# Patient Record
Sex: FEMALE | Race: WHITE | NOT HISPANIC OR LATINO | Employment: UNEMPLOYED | URBAN - METROPOLITAN AREA
[De-identification: names, ages, dates, MRNs, and addresses within clinical notes are randomized per-mention and may not be internally consistent; named-entity substitution may affect disease eponyms.]

---
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
## 2022-02-21 ENCOUNTER — OFFICE VISIT (OUTPATIENT)
Dept: SURGERY | Facility: CLINIC | Age: 22
End: 2022-02-21
Payer: COMMERCIAL

## 2022-02-21 VITALS — BODY MASS INDEX: 31.08 KG/M2 | WEIGHT: 158.3 LBS | HEIGHT: 60 IN

## 2022-02-21 DIAGNOSIS — E66.811 CLASS 1 OBESITY DUE TO EXCESS CALORIES WITH SERIOUS COMORBIDITY AND BODY MASS INDEX (BMI) OF 32.0 TO 32.9 IN ADULT: ICD-10-CM

## 2022-02-21 DIAGNOSIS — E66.09 CLASS 1 OBESITY DUE TO EXCESS CALORIES WITH SERIOUS COMORBIDITY AND BODY MASS INDEX (BMI) OF 32.0 TO 32.9 IN ADULT: ICD-10-CM

## 2022-02-21 DIAGNOSIS — E66.89 HYPOTHALAMIC OBESITY: ICD-10-CM

## 2022-02-21 PROCEDURE — 97803 MED NUTRITION INDIV SUBSEQ: CPT | Performed by: DIETITIAN, REGISTERED

## 2022-02-21 NOTE — PROGRESS NOTES
MEDICAL WEIGHT LOSS FOLLOW UP    Reason for visit: medical weight loss     DIAGNOSIS:  Class I Obesity    ANTHROPOMETRICS:  Initial Weight: 168.8 pounds (10/20/2021)  Previous Weight: 164.1 pounds (11/29/2021)  Current Weight:  158 lbs 4.8 oz   Weight Change: -5.8 lbs since last appointment  -10.4 lbs overall  BMI: Body mass index is 30.92 kg/m .     Weight Loss Medications:   None    NUTRITION HISTORY:  Breakfast: yogurt + fruit + granola   Lunch: salad + chicken/lean meat + yogurt and fruit  leftovers  meat + vegetables  Dinner: Casserole  grilled meats + vegetables + starch  salad   Snacks: goldfish, trail mix, fruit, candy, popcorn     Beverage choices:  Water/enhanced water, Specialty coffee (1x/week when meeting with friends), coffee (8oz, creamer), juice box (occasional), milk (occasionally),     Dining Out:  How often do you dine out: 1-2x/week  What types of food do you order: salad, fast food- [employer]       Exercise:  Type: walking, physical therapy (balance work; upper arm therapy), [employer]   Duration: 30 minutes or 1 mile  Frequency: 4-5x/week    Additional Information: Pt late; shorter visit. Presents with Dad (Valdo). Continues with many positive changes to meal quality and quantity. Encouraged ongoing mindfulness to hunger/satiety cues and protein intake. Pt's father will as physical therapist about appropriateness of strength training as balance continues to improve.      EVALUATION/PROGRESS TOWARDS GOALS:  Previous Goals:   Increase exercise to 4-5x/week - met  Try a new vegetable or recipe 2x/month - met/ongoing    Previous Nutrition Diagnosis:  Obese class I related to excess energy intake and self-monitoring deficit as evidenced by BMI of 32.05 kg/m2.  No change, modified below     Current Nutrition Diagnosis:   Obese class I related to excess energy intake and self-monitoring deficit as evidenced by BMI of 30.92 kg/m2.    INTERVENTION:    Nutrition Prescription:  Recommend modified  nutrient intake by decreasing energy intake.    Implementation:    Nutrition Education (Content):    Discussed previous goals and determined new goals    Encourage physical activity    Supported patient in attempted weight loss and behavior changes    Congratulated patient on successful weight loss     Patient verbalizes understanding of diet by stating she will continue to emphasize protein    Anticipate good compliance    Goals:  Continue to emphasize protein and vegetables at meal  Increase exercise as able pending PT/OT input    Follow Up/Monitoring:  Other  -  patient to follow up in 12 weeks    Time Spent With Patient:  15 Minutes        Delia Loving RD, LD  Clinical Dietitian

## 2022-03-07 DIAGNOSIS — Q61.3 POLYCYSTIC KIDNEY DISEASE: ICD-10-CM

## 2022-03-07 RX ORDER — LISINOPRIL 2.5 MG/1
2.5 TABLET ORAL DAILY
Qty: 30 TABLET | Refills: 0 | Status: SHIPPED | OUTPATIENT
Start: 2022-03-07

## 2022-05-17 NOTE — PROGRESS NOTES
MEDICAL WEIGHT LOSS FOLLOW UP      DIAGNOSIS:  Class I Obesity    NUTRITION HISTORY:    Breakfast: 1 piece egg bake (peppers, black beans, eggs, hash browns, cheese), Greek yogurt @ 6:30-7:30     Lunch:  lettuce salad kit with chicken, some day small bag trail mix or apple @ 11:00     Dinner:  Chicken quesidilla (peppers, onion, cheese)  Or hamburger on bun, french fries or lettuce salad @ 6:30-7:00     Snacks:  Kind bar, apple- after school/ on weekends might bring trail mix to game     Beverage Choices:  48 oz water, 2 cans sparkling water, 1 cup coffee + flavored creamer     Exercise: walking-3-4X for 20-30 min. And home PT regimen (core/ balance) 2X per week        Other: **Patient is accompanied by hr mom, Leigh Ann. *Tracking intake in her calendar on her phone.Full time student.     ANTHROPOMETRICS:    Initial Weight: 168.8 pounds    Previous Weight: 158.3  pounds    Current Weight:  155.1 pounds    Weight Change:  decrease 3.2 lb since last RD visit or  13.1 lb over all    BMI: 30.28 kg/m2    MEDICATIONS FOR WEIGHT LOSS:  None    EVALUATION/PROGRESS TOWARDS GOALS:  Previous Goals:  Continue to emphasize protein and vegetables at meal-improving (doing more chicken or beef, yogurt, legumes, salad kits, cucumbers, carrots)  Increase exercise as able pending PT/OT input-pt says done with OT and PT      Previous Nutrition Diagnosis:  Obese class I related to excess energy intake as evidence by BMI of 30.92 kg/m2     Current Nutrition Diagnosis:   Obese class I related to excess energy intake as evidence by BMI of 30.28 kg/m2  No change      INTERVENTION:    Nutrition Prescription:  Recommend modified nutrient intake by decreasing energy intake    Implementation:    Meals and Snacks: 3/1    Nutrition Education (Content):    Discussed previous goals and determined new goals    Encourage physical activity    Supported patient in attempted weight loss and behavior changes     Congratulated patient on successful  weight loss     Patient verbalizes understanding of weight loss    Anticipate good compliance    Goals:  Do home Pt regimen as advised every other day  Slowly increase fiber to 25-35 grams per day    Follow Up/Monitoring:  Other  -  patient to follow up in 4 months    Time Spent With Patient:  28 Minutes  Jermaine Gutierrez RD, LD  [facility]

## 2022-05-23 ENCOUNTER — ALLIED HEALTH/NURSE VISIT (OUTPATIENT)
Dept: SURGERY | Facility: CLINIC | Age: 22
End: 2022-05-23
Payer: COMMERCIAL

## 2022-05-23 VITALS — WEIGHT: 155.1 LBS | BODY MASS INDEX: 30.29 KG/M2

## 2022-05-23 DIAGNOSIS — E66.09 CLASS 1 OBESITY DUE TO EXCESS CALORIES WITH SERIOUS COMORBIDITY AND BODY MASS INDEX (BMI) OF 32.0 TO 32.9 IN ADULT: ICD-10-CM

## 2022-05-23 DIAGNOSIS — E66.811 CLASS 1 OBESITY DUE TO EXCESS CALORIES WITH SERIOUS COMORBIDITY AND BODY MASS INDEX (BMI) OF 32.0 TO 32.9 IN ADULT: ICD-10-CM

## 2022-05-23 PROCEDURE — 97803 MED NUTRITION INDIV SUBSEQ: CPT | Performed by: DIETITIAN, REGISTERED
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
## 2022-06-02 ENCOUNTER — OFFICE VISIT (OUTPATIENT)
Dept: ENDOCRINOLOGY | Facility: CLINIC | Age: 22
End: 2022-06-02
Attending: PEDIATRICS
Payer: COMMERCIAL

## 2022-06-02 VITALS
DIASTOLIC BLOOD PRESSURE: 92 MMHG | HEIGHT: 60 IN | WEIGHT: 153.22 LBS | BODY MASS INDEX: 30.08 KG/M2 | HEART RATE: 78 BPM | SYSTOLIC BLOOD PRESSURE: 162 MMHG

## 2022-06-02 DIAGNOSIS — E66.89 HYPOTHALAMIC OBESITY: Primary | ICD-10-CM

## 2022-06-02 PROCEDURE — 99214 OFFICE O/P EST MOD 30 MIN: CPT | Performed by: PEDIATRICS

## 2022-06-02 PROCEDURE — G0463 HOSPITAL OUTPT CLINIC VISIT: HCPCS

## 2022-06-02 ASSESSMENT — PAIN SCALES - GENERAL: PAINLEVEL: NO PAIN (0)

## 2022-06-02 NOTE — NURSING NOTE
"[facility] [[id number]]  Chief Complaint   Patient presents with     RECHECK     Follow Up     Initial BP (!) 162/92   Pulse 78   Ht 5' 0.08\" (152.6 cm)   Wt 153 lb 3.5 oz (69.5 kg)   BMI 29.85 kg/m   Estimated body mass index is 29.85 kg/m  as calculated from the following:    Height as of this encounter: 5' 0.08\" (152.6 cm).    Weight as of this encounter: 153 lb 3.5 oz (69.5 kg).  Medication Reconciliation: complete     Claire Pa, EMT        "
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
## 2022-06-02 NOTE — PATIENT INSTRUCTIONS
Thank you for choosing [facility].     It was a pleasure to see you today.      Providers:       [place]:    MD Elidia Márquez MD Eric Bomberg MD Sandy Chen Liu, MD Bradley Miller MD PhD      [facility]sysa [facility]ayas [facility]    Care Coordinators (non urgent calls) Mon- Fri:  Barbie Jerome MS RN  [phone]   Sully Lynn, RN, CPN  [phone]  Kaitlyn Newberry, ELINOR, [phone]     Care Coordinator fax: [phone]    Growth Hormone: Deirdre Spivey CMA   [phone]     Please leave a message on one line only. Calls will be returned as soon as possible once your physician has reviewed the results or questions.   Medication renewal requests must be faxed to the main office by your pharmacy.  Allow 3-4 days for completion.   Fax: [phone]    Mailing Address:  Pediatric Endocrinology  Academic Office [address]  [address]    Test results may be available via Tuee prior to your provider reviewing them. Your provider will review results as soon as possible once all labs are resulted.   Abnormal results will be communicated to you via Tuee, telephone call or letter.  Please allow 2 -3 weeks for processing/interpretation of most lab work.  If you live in the [facility] area and need labs, we request that the labs be done at an [facility] facility.  [place] locations are listed on the [email or web address] website. Please call that site for a lab time.   For urgent issues that cannot wait until the next business day, call [phone] and ask for the Pediatric Endocrinologist on call.    Scheduling:    Access Center: [phone] for [facility] - 3rd floor [facility] 9th floor [facility] [phone] (for stimulation tests)  Radiology/ [phone]   Services:   [phone]     Please sign up for Tuee for easy and  HIPAA compliant confidential communication.  Sign up at the clinic  or go to [email or web address]   Patients must be seen in clinic annually to continue to receive prescriptions and test results.   Patients on growth hormone must be seen twice yearly.     COVID-19 Recommendations: Pediatric Endocrinology  The Division of Endocrinology at the [facility] encourages our patients to receive vaccination against the SARS CoV2 virus that causes COVID-19. At this time, the only vaccine approved in children is the Pfizer vaccine for children 12 years or older. If you are 12 years or older, we encourage you to receive the first vaccine that is available to you.   Please go to [email or web address] to register to receive your vaccine at an [facility] location.  Once you are registered, you will be contacted to schedule an appointment when vaccine is available.   Please go to [email or web address] to register to receive your vaccine through the [facility]'s Vaccine Connector portal. You will be contacted to schedule an appointment when vaccine is available.  You can also register to receive the vaccine from a local pharmacy.  As vaccines receive Emergency Use Authorization or Approval by the FDA for younger ages, we recommend that all children with endocrine disorders receive the vaccine unless there is an allergy to the vaccine or its ingredients. Children receiving endocrine medications such as growth hormone, hydrocortisone or levothyroxine are still eligible to receive the vaccination.   If you would like to get your child tested for COVID-19, please go to [email or web address] for information about [facility] testing locations.    Your child has been seen in the Pediatric Endocrinology Specialty Clinic.  Our goal is to co-manage your child's medical care  along with their primary care physician.  We manage care needs related to the endocrine diagnosis but primary care issues including preventative care or acute illness visits, COVID concerns, camp forms, etc must be managed by your local primary care physician.  Please inform our coordinators if the patient has any emergency department visits or hospitalizations related to their endocrine diagnosis.      Please refer to the CDC and [facility] department of health websites for information regarding precautions surrounding COVID-19.  At this time, there is no evidence to suggest that your child's endocrine diagnosis increases risk for karina COVID-19.  This is an ongoing area of research, however,and we will update you as further research becomes available.

## 2022-06-27 ENCOUNTER — LAB REQUISITION (OUTPATIENT)
Dept: LAB | Facility: CLINIC | Age: 22
End: 2022-06-27

## 2022-06-27 LAB
HBV SURFACE AB SERPL IA-ACNC: 6.31 M[IU]/ML
HBV SURFACE AG SERPL QL IA: NONREACTIVE

## 2022-06-27 PROCEDURE — 87340 HEPATITIS B SURFACE AG IA: CPT | Performed by: INTERNAL MEDICINE

## 2022-06-27 PROCEDURE — 86706 HEP B SURFACE ANTIBODY: CPT | Performed by: INTERNAL MEDICINE

## 2022-06-27 PROCEDURE — 86481 TB AG RESPONSE T-CELL SUSP: CPT | Performed by: INTERNAL MEDICINE

## 2022-06-28 LAB
GAMMA INTERFERON BACKGROUND BLD IA-ACNC: 0.05 IU/ML
M TB IFN-G BLD-IMP: NEGATIVE
M TB IFN-G CD4+ BCKGRND COR BLD-ACNC: 9.95 IU/ML
MITOGEN IGNF BCKGRD COR BLD-ACNC: -0.02 IU/ML
MITOGEN IGNF BCKGRD COR BLD-ACNC: -0.02 IU/ML
QUANTIFERON MITOGEN: 10 IU/ML
QUANTIFERON NIL TUBE: 0.05 IU/ML
QUANTIFERON TB1 TUBE: 0.03 IU/ML
QUANTIFERON TB2 TUBE: 0.03

## 2022-08-30 NOTE — TELEPHONE ENCOUNTER
Faxed referral request for Dr. Lalit Paniagua with adult nephrology, visit note and demographics to [facility] at [phone].  Attn: Jing Pearce.  Asked they reach out to patient to schedule appointment.   [facility]          Chief Complaint        Chief Complaint   Patient presents with    SS/URI     ST, cough, fever, sneezing             History of Present Illness           Shira Morel is a 22 y.o. female with chronic conditions of none who presents today for sore throat, cough, and congestion.  Pt's s/s started 3 days ago.  Pt denies fever, SOB, chest pains, n/v, and dizziness.          Past Medical History:     History reviewed. No pertinent past medical history.          Past Surgical History:      has no past surgical history on file.          Social History:     Social History     Tobacco Use    Smoking status: Never    Smokeless tobacco: Never   Substance Use Topics    Alcohol use: Never             I personally reviewed all past medical, surgical, and social.           Review of Systems   Constitutional:  Positive for fatigue.   HENT:  Positive for congestion, sinus pressure and sore throat.    Eyes: Negative.    Respiratory:  Positive for cough.    Cardiovascular: Negative.    Gastrointestinal: Negative.    Endocrine: Negative.    Genitourinary: Negative.    Musculoskeletal: Negative.    Skin: Negative.    Allergic/Immunologic: Negative.    Neurological: Negative.    Psychiatric/Behavioral: Negative.              Medications:     Outpatient Encounter Medications as of 8/30/2022   Medication Sig Dispense Refill    etonogestrel (NEXPLANON SDRM) by Subdermal route.      azithromycin (Z-RUSS) 250 MG tablet TAKE TWO TABS PO ON DAY 1, THEN TAKE ONE TAB A DAY FOR 4 DAYS 6 tablet 0    methylPREDNISolone (MEDROL DOSEPACK) 4 mg tablet use as directed 21 each 0     No facility-administered encounter medications on file as of 8/30/2022.             Allergies:     Review of patient's allergies indicates:  No Known Allergies          Health Maintenance:       There is no immunization history on file for this patient.      Health Maintenance   Topic Date Due    Hepatitis C Screening  Never done    Lipid  "Panel  Never done    HPV Vaccines (1 - 2-dose series) Never done    TETANUS VACCINE  Never done              Physical Exam           Vital Signs  Temp: 97 °F (36.1 °C)  Temp src: Oral  Pulse: 93  SpO2: 99 %  BP: 110/80  BP Location: Left arm  Patient Position: Sitting  Height and Weight  Height: 5' 6" (167.6 cm)  Weight: 80.3 kg (177 lb)  BSA (Calculated - sq m): 1.93 sq meters  BMI (Calculated): 28.6  Weight in (lb) to have BMI = 25: 154.6]          Physical Exam  Vitals and nursing note reviewed.   Constitutional:       General: She is not in acute distress.     Appearance: Normal appearance. She is not ill-appearing.   HENT:      Head: Normocephalic.      Right Ear: External ear normal.      Left Ear: External ear normal.      Nose: Congestion and rhinorrhea present.      Mouth/Throat:      Mouth: Mucous membranes are moist.   Eyes:      Pupils: Pupils are equal, round, and reactive to light.   Cardiovascular:      Rate and Rhythm: Normal rate and regular rhythm.      Pulses: Normal pulses.      Heart sounds: Normal heart sounds. No murmur heard.    No friction rub. No gallop.   Pulmonary:      Effort: Pulmonary effort is normal. No respiratory distress.      Breath sounds: Normal breath sounds. No stridor. No wheezing, rhonchi or rales.   Abdominal:      General: There is no distension.      Palpations: Abdomen is soft.   Musculoskeletal:         General: Normal range of motion.      Cervical back: Normal range of motion and neck supple. No rigidity or tenderness.   Skin:     General: Skin is warm and dry.      Coloration: Skin is not jaundiced or pale.   Neurological:      General: No focal deficit present.      Mental Status: She is alert and oriented to person, place, and time. Mental status is at baseline.      Cranial Nerves: No cranial nerve deficit.      Sensory: No sensory deficit.      Motor: No weakness.      Coordination: Coordination normal.      Gait: Gait normal.   Psychiatric:         Mood and " Affect: Mood normal.         Behavior: Behavior normal.         Thought Content: Thought content normal.         Judgment: Judgment normal.              Laboratory:     CBC:            CMP:           Invalid input(s): CREATININ     LIPIDS:            TSH:            A1C:                 Assessment/Plan          Shira Morel is a 22 y.o.female with:           1. Pharyngitis, unspecified etiology  - methylPREDNISolone (MEDROL DOSEPACK) 4 mg tablet; use as directed  Dispense: 21 each; Refill: 0  - azithromycin (Z-RUSS) 250 MG tablet; TAKE TWO TABS PO ON DAY 1, THEN TAKE ONE TAB A DAY FOR 4 DAYS  Dispense: 6 tablet; Refill: 0      -ideally treat s/s for the next 5-7 days; advised not to take the azithromycin until at least 5 days from now         Total time spent face-to-face and non-face-to-face coordinating care for this encounter was: 20 min          Chronic conditions status updated as per HPI.  Other than changes above, cont current medications and maintain follow up with specialists.  Return to clinic PRN.          ENRIQUE Hogan     [facility]

## 2022-09-20 ENCOUNTER — VIRTUAL VISIT (OUTPATIENT)
Dept: SURGERY | Facility: CLINIC | Age: 22
End: 2022-09-20
Payer: COMMERCIAL

## 2022-09-20 VITALS — BODY MASS INDEX: 31.1 KG/M2 | WEIGHT: 158.4 LBS | HEIGHT: 60 IN

## 2022-09-20 DIAGNOSIS — E66.811 CLASS 1 OBESITY DUE TO EXCESS CALORIES WITH SERIOUS COMORBIDITY AND BODY MASS INDEX (BMI) OF 32.0 TO 32.9 IN ADULT: ICD-10-CM

## 2022-09-20 DIAGNOSIS — E66.09 CLASS 1 OBESITY DUE TO EXCESS CALORIES WITH SERIOUS COMORBIDITY AND BODY MASS INDEX (BMI) OF 32.0 TO 32.9 IN ADULT: ICD-10-CM

## 2022-09-20 PROCEDURE — 97803 MED NUTRITION INDIV SUBSEQ: CPT | Mod: 95 | Performed by: DIETITIAN, REGISTERED

## 2022-09-20 NOTE — PROGRESS NOTES
Libby is a 22 year old who is being evaluated via a billable video visit.      How would you like to obtain your AVS? MyChart  If the video visit is dropped, the invitation should be resent by: Text to cell phone: [phone]  Will anyone else be joining your video visit? Dad (Valdo)          Video-Visit Details    Video Start Time: 8:28am    Type of service:  Video Visit    Video End Time:8:48am    Originating Location (pt. Location): Home    Distant Location (provider location):  [facility] [place]     Platform used for Video Visit: Proactive Comfort      MEDICAL WEIGHT LOSS FOLLOW UP    Reason for visit: medical weight loss     DIAGNOSIS:  Class I Obesity    ANTHROPOMETRICS:  Initial Weight: 168.8 pounds  Previous Weight: 155.1 pounds  Current Weight:  158 lbs 6.4 oz   Weight Change: +3.3 lbs   BMI: 30.85 kg/m2    Weight Loss Medications:   None    NUTRITION HISTORY:  Breakfast: yogurt + granola +/- fruit   Lunch: casseroles (leftovers)  chicken sandwich  wrap   Dinner: roast beef + potatoes + carrots  pizza  meat + vegetables + starch  Snacks: sweets (if at home)  apples + peanut butter  banana  yogurt  popcorn   Beverage choices: water (84oz), coffee (8-16oz; creamer), milk (occasionally), sparkling water   Eating behaviors: will have sweets if they are in the home    Dining Out:  How often do you dine out: 1-2x/week  What types of food do you order: pasta, Chinese, salad        Exercise:  Type: walking around school building  Duration: 10-15 minutes   Frequency: 2-3x/week    Additional Information: Pt and Dad share they had a very busy summer of baseball-related travel for pt's brother. Also enrolled in classes at both [employer] and [employer]. Less focus on exercise the last several months. Appropriate questions about intake of sweets recently. Discussed exercise videos as good outlet for movement options in between classes. Discussed non-food alternatives to cravings.       EVALUATION/PROGRESS TOWARDS GOALS:  Previous Goals:   Do home Pt regimen as advised every other day - ongoing  Slowly increase fiber to 25-35 grams per day - ongoing    Previous Nutrition Diagnosis:  Obese class I related to excess energy intake and self-monitoring deficit as evidenced by BMI of 30.28 kg/m2.  No change, modified below     Current Nutrition Diagnosis:   Obese class I related to excess energy intake and self-monitoring deficit as evidenced by BMI of 30.85 kg/m2.    INTERVENTION:    Nutrition Prescription:  Recommend modified nutrient intake by decreasing energy intake.    Implementation:    Nutrition Education (Content):    Discussed previous goals and determined new goals    Encourage physical activity    Supported patient in attempted weight loss and behavior changes    Patient verbalizes understanding of diet by stating she will increase exercise    Anticipate fair-good compliance    Goals:  Be mindful of intake of sweets  Increase exercise - explore online exercise videos    Follow Up/Monitoring:  Other  -  patient to follow up in 12-16 weeks    Time Spent With Patient:  20 Minutes      Delia Loving RD, LD  Clinical Dietitian

## 2022-09-20 NOTE — PATIENT INSTRUCTIONS
"Tomás Souza!    Great to see you guys this morning! Here's the goals we discussed:    Be mindful with your intake of sweets  - Try keeping sweets out of reach (taller shelf or cabinet) to reduce mindless reaching/grabbing snacks throughout the day  - Keep a log/journal when you eat sweets, also noting any emotions you are experiencing (ie boredom, stress, sadness, loneliness, etc)  - Practice non-food alternatives to cravings: crossword puzzles/word games, reading, games, arts/crafts/hobbies, adult coloring books, journaling, etc      Increase exercise  - Explore online exercise videos  - With students, it's particularly helpful to have several options for 5-10 minute \"bursts\" of activity to sprinkle throughout your day/in between classes  - Maximize your days off with longer walks/workouts  - Continue to walk around campus when able  - Create a daily \"itinerary\" for yourself to identify where you can squeeze in some exercise        Otherwise, have a wonderful semester and keep up the good work. Let's plan on following up in December - this can be scheduled via our call center at . Reach out if you have any questions or concerns. Have a great week!      Delia Loving RD, LD  Clinical Dietitian     "

## 2022-12-26 ENCOUNTER — HEALTH MAINTENANCE LETTER (OUTPATIENT)
Age: 22
End: 2022-12-26

## 2024-02-04 ENCOUNTER — HEALTH MAINTENANCE LETTER (OUTPATIENT)
Age: 24
End: 2024-02-04

## 2024-02-20 ENCOUNTER — MEDICAL CORRESPONDENCE (OUTPATIENT)
Dept: HEALTH INFORMATION MANAGEMENT | Facility: CLINIC | Age: 24
End: 2024-02-20
Payer: COMMERCIAL

## 2024-02-20 ENCOUNTER — TRANSFERRED RECORDS (OUTPATIENT)
Dept: HEALTH INFORMATION MANAGEMENT | Facility: CLINIC | Age: 24
End: 2024-02-20
Payer: COMMERCIAL

## 2024-02-22 DIAGNOSIS — R00.0 TACHYCARDIA: Primary | ICD-10-CM

## 2024-03-01 ENCOUNTER — OFFICE VISIT (OUTPATIENT)
Dept: CARDIOLOGY | Facility: CLINIC | Age: 24
End: 2024-03-01
Attending: PEDIATRICS
Payer: COMMERCIAL

## 2024-03-01 VITALS
SYSTOLIC BLOOD PRESSURE: 114 MMHG | BODY MASS INDEX: 33.18 KG/M2 | WEIGHT: 169 LBS | HEART RATE: 77 BPM | HEIGHT: 60 IN | DIASTOLIC BLOOD PRESSURE: 68 MMHG | OXYGEN SATURATION: 99 %

## 2024-03-01 DIAGNOSIS — R00.0 TACHYCARDIA: ICD-10-CM

## 2024-03-01 DIAGNOSIS — R00.2 PALPITATIONS: ICD-10-CM

## 2024-03-01 DIAGNOSIS — R06.02 SOB (SHORTNESS OF BREATH): Primary | ICD-10-CM

## 2024-03-01 PROCEDURE — 93242 EXT ECG>48HR<7D RECORDING: CPT | Performed by: INTERNAL MEDICINE

## 2024-03-01 PROCEDURE — 93000 ELECTROCARDIOGRAM COMPLETE: CPT | Mod: 59 | Performed by: INTERNAL MEDICINE

## 2024-03-01 PROCEDURE — 99214 OFFICE O/P EST MOD 30 MIN: CPT | Mod: 25 | Performed by: INTERNAL MEDICINE

## 2024-03-01 RX ORDER — FEXOFENADINE HCL 180 MG/1
180 TABLET ORAL DAILY
COMMUNITY
End: 2024-08-26

## 2024-03-01 RX ORDER — BENZONATATE 200 MG/1
200 CAPSULE ORAL
COMMUNITY
Start: 2024-02-18 | End: 2024-04-19

## 2024-03-01 RX ORDER — ALBUTEROL SULFATE 90 UG/1
2 AEROSOL, METERED RESPIRATORY (INHALATION)
COMMUNITY
Start: 2024-02-02 | End: 2024-08-26

## 2024-03-01 RX ORDER — MONTELUKAST SODIUM 10 MG/1
10 TABLET ORAL AT BEDTIME
COMMUNITY
End: 2024-08-26

## 2024-03-01 RX ORDER — CEFDINIR 300 MG/1
CAPSULE ORAL
COMMUNITY
Start: 2024-02-19 | End: 2024-08-26

## 2024-03-01 RX ORDER — BECLOMETHASONE DIPROPIONATE HFA 40 UG/1
2 AEROSOL, METERED RESPIRATORY (INHALATION)
COMMUNITY
Start: 2024-02-02 | End: 2024-09-26

## 2024-03-01 RX ORDER — ACETAMINOPHEN 325 MG/1
325-650 TABLET ORAL EVERY 6 HOURS PRN
COMMUNITY
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
## 2024-03-01 NOTE — PROGRESS NOTES
Debra Severino arrived here on 3/1/2024 12:22 PM for 3-7 Days  Zio monitor placement per ordering provider Dr Donald for the diagnosis Tachycardia.  Patient s skin was prepped per protocol.  Zio monitor was placed.  Instructions were reviewed with and given to the patient.  Patient verbalized understanding of wear, troubleshooting and monitor return instructions.

## 2024-03-12 ENCOUNTER — HOSPITAL ENCOUNTER (OUTPATIENT)
Dept: CARDIOLOGY | Facility: CLINIC | Age: 24
Discharge: HOME | End: 2024-03-12
Attending: INTERNAL MEDICINE | Admitting: INTERNAL MEDICINE
Payer: COMMERCIAL

## 2024-03-12 DIAGNOSIS — R00.2 PALPITATIONS: ICD-10-CM

## 2024-03-12 DIAGNOSIS — R00.0 TACHYCARDIA: ICD-10-CM

## 2024-03-12 DIAGNOSIS — R06.02 SOB (SHORTNESS OF BREATH): ICD-10-CM

## 2024-03-12 PROCEDURE — 93306 TTE W/DOPPLER COMPLETE: CPT | Mod: 26 | Performed by: INTERNAL MEDICINE

## 2024-03-12 PROCEDURE — 93306 TTE W/DOPPLER COMPLETE: CPT

## 2024-03-14 PROCEDURE — 93244 EXT ECG>48HR<7D REV&INTERPJ: CPT | Performed by: INTERNAL MEDICINE

## 2024-04-19 ENCOUNTER — OFFICE VISIT (OUTPATIENT)
Dept: CARDIOLOGY | Facility: CLINIC | Age: 24
End: 2024-04-19
Attending: INTERNAL MEDICINE
Payer: COMMERCIAL

## 2024-04-19 VITALS
HEART RATE: 78 BPM | HEIGHT: 60 IN | OXYGEN SATURATION: 98 % | BODY MASS INDEX: 33.1 KG/M2 | WEIGHT: 168.6 LBS | SYSTOLIC BLOOD PRESSURE: 116 MMHG | DIASTOLIC BLOOD PRESSURE: 64 MMHG

## 2024-04-19 DIAGNOSIS — E66.09 CLASS 1 OBESITY DUE TO EXCESS CALORIES WITH SERIOUS COMORBIDITY AND BODY MASS INDEX (BMI) OF 32.0 TO 32.9 IN ADULT: ICD-10-CM

## 2024-04-19 DIAGNOSIS — E66.811 CLASS 1 OBESITY DUE TO EXCESS CALORIES WITH SERIOUS COMORBIDITY AND BODY MASS INDEX (BMI) OF 32.0 TO 32.9 IN ADULT: ICD-10-CM

## 2024-04-19 DIAGNOSIS — R06.02 SOB (SHORTNESS OF BREATH): Primary | ICD-10-CM

## 2024-04-19 DIAGNOSIS — R00.2 PALPITATIONS: ICD-10-CM

## 2024-04-19 PROCEDURE — 99214 OFFICE O/P EST MOD 30 MIN: CPT | Performed by: NURSE PRACTITIONER

## 2024-04-19 NOTE — PATIENT INSTRUCTIONS
Thanks for participating in a office visit with the [facility] today.    Doing well on a cardiac standpoint   Reviewed results of normal echocardiogram and Zio patch monitor   Blood pressure well controlled.   Encourage exercise, weight loss and heart healthy diet.     Please call my nurse at  [phone] with any questions or concerns.    Scheduling phone number: [phone]  Reminder: Please bring in all current medications, over the counter supplements and vitamin bottles to your next appointment.
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
## 2024-08-26 ENCOUNTER — MYC REFILL (OUTPATIENT)
Dept: NEPHROLOGY | Facility: CLINIC | Age: 24
End: 2024-08-26

## 2024-08-26 ENCOUNTER — OFFICE VISIT (OUTPATIENT)
Dept: OBGYN | Facility: CLINIC | Age: 24
End: 2024-08-26
Payer: COMMERCIAL

## 2024-08-26 VITALS
BODY MASS INDEX: 33.96 KG/M2 | WEIGHT: 173 LBS | SYSTOLIC BLOOD PRESSURE: 122 MMHG | DIASTOLIC BLOOD PRESSURE: 80 MMHG | HEIGHT: 60 IN

## 2024-08-26 DIAGNOSIS — Z12.4 SCREENING FOR CERVICAL CANCER: Primary | ICD-10-CM

## 2024-08-26 DIAGNOSIS — Z11.3 SCREENING EXAMINATION FOR STI: ICD-10-CM

## 2024-08-26 DIAGNOSIS — Q61.3 POLYCYSTIC KIDNEY DISEASE: ICD-10-CM

## 2024-08-26 DIAGNOSIS — Z01.419 ENCOUNTER FOR BREAST AND PELVIC EXAMINATION: ICD-10-CM

## 2024-08-26 PROCEDURE — 99385 PREV VISIT NEW AGE 18-39: CPT | Performed by: OBSTETRICS & GYNECOLOGY

## 2024-08-26 PROCEDURE — 99459 PELVIC EXAMINATION: CPT | Performed by: OBSTETRICS & GYNECOLOGY

## 2024-08-26 PROCEDURE — 87591 N.GONORRHOEAE DNA AMP PROB: CPT | Performed by: OBSTETRICS & GYNECOLOGY

## 2024-08-26 PROCEDURE — 87491 CHLMYD TRACH DNA AMP PROBE: CPT | Performed by: OBSTETRICS & GYNECOLOGY

## 2024-08-26 PROCEDURE — G0145 SCR C/V CYTO,THINLAYER,RESCR: HCPCS | Performed by: OBSTETRICS & GYNECOLOGY

## 2024-08-26 RX ORDER — LISINOPRIL 2.5 MG/1
2.5 TABLET ORAL DAILY
Qty: 30 TABLET | Refills: 0 | OUTPATIENT
Start: 2024-08-26

## 2024-08-26 NOTE — TELEPHONE ENCOUNTER
Patient no longer followed by Dr. Guerin.  Has provider at [facility], last seen 4/2024. [employer] message sent to reach out to that provider for refills.

## 2024-08-26 NOTE — NURSING NOTE
Chief Complaint   Patient presents with    Gyn Exam     1st pap       Initial /80 (BP Location: Left arm, Patient Position: Chair, Cuff Size: Adult Large)   Ht 1.524 m (5')   Wt 78.5 kg (173 lb)   LMP 2024 (Exact Date)   Breastfeeding No   BMI 33.79 kg/m   Estimated body mass index is 33.79 kg/m  as calculated from the following:    Height as of this encounter: 1.524 m (5').    Weight as of this encounter: 78.5 kg (173 lb).  BP completed using cuff size: large    Questioned patient about current smoking habits.  Pt. has never smoked.          The following HM Due: pap smear, GC/C  Lisa Sierra CMA

## 2024-08-26 NOTE — PROGRESS NOTES
Debra is a 23 year old  female who presents for annual exam.     Menses are regular q 28-30 days and normal lasting 5 days.  Menses flow: normal.  Patient's last menstrual period was 2024 (exact date).. Using none for contraception.  She is not currently considering pregnancy.  Besides routine health maintenance, she has no other health concerns today .  Para presents today for breast and pelvic exam.  She has not yet had a pelvic exam, she is due for her first Pap smear.  Periods are fairly regular.  She does not take any type of contraception.  She is not currently sexually active.  She does use tampons for menstrual bleeding.  History of TBI in .  GYNECOLOGIC HISTORY:    Debra is not sexually   History sexually transmitted infections:No STD history  STI testing offered?  Accepted  History of abnormal Pap smear: No - age 21-29 PAP every 3 years recommended  Family history of breast CA: No  Family history of uterine/ovarian CA: No      HEALTH MAINTENANCE:  .  Regular Self Breast Exams: No  TSH:   TSH   Date Value Ref Range Status   2021 3.96 0.40 - 4.00 mU/L Final   2016 5.00 (H) 0.40 - 4.00 mU/L Final      Pap; 1st pap today  PHQ2: 0      2024     9:03 AM 2024     6:30 PM   PHQ-2 (  [employer])   Q1: Little interest or pleasure in doing things 0 0   Q2: Feeling down, depressed or hopeless 0 0   PHQ-2 Score 0 0   Q1: Little interest or pleasure in doing things  Not at all   Q2: Feeling down, depressed or hopeless  Not at all   PHQ-2 Score  0         HISTORY:  OB History    Para Term  AB Living   0 0 0 0 0 0   SAB IAB Ectopic Multiple Live Births   0 0 0 0 0     History reviewed. No pertinent past medical history.  History reviewed. No pertinent surgical history.  History reviewed. No pertinent family history.  Social History     Socioeconomic History    Marital status: Single     Spouse name: None    Number of children: None    Years of education: None     Highest education level: None   Tobacco Use    Smoking status: Never    Smokeless tobacco: Never   Substance and Sexual Activity    Alcohol use: Yes     Comment: occ    Drug use: Not Currently    Sexual activity: Not Currently     Partners: Male     Social Determinants of Health     Financial Resource Strain: Patient Declined (3/3/2023)    Received from [facility]    Overall Financial Resource Strain (CARDIA)     Difficulty of Paying Living Expenses: Patient declined   Food Insecurity: No Food Insecurity (3/28/2024)    Received from [facility]    Hunger Vital Sign     Worried About Running Out of Food in the Last Year: Never true     Ran Out of Food in the Last Year: Never true   Transportation Needs: No Transportation Needs (3/28/2024)    Received from [facility]    PRAPARE - Transportation     Lack of Transportation (Medical): No     Lack of Transportation (Non-Medical): No   Physical Activity: Insufficiently Active (3/28/2024)    Received from [facility]    Exercise Vital Sign     Days of Exercise per Week: 2 days     Minutes of Exercise per Session: 20 min   Stress: No Stress Concern Present (3/3/2023)    Received from [facility]    [employer] [place] [employer] - Occupational Stress Questionnaire     Feeling of Stress : Not at all   Social Connections: Socially Isolated (3/3/2023)    Received from [facility]    Social Connection and Isolation Panel [NHANES]     Frequency of Communication with Friends and Family: Never     Frequency of Social Gatherings with Friends and Family: Once a week     Attends Spiritism Services: Never     Active Member of Clubs or Organizations: No     Attends Club or Organization Meetings: Never     Marital Status: Never    Interpersonal Safety: Patient Declined (3/3/2023)    Received from [facility]    Humiliation, Afraid, Rape, and Kick questionnaire     Fear of Current  or Ex-Partner: Patient declined     Emotionally Abused: Patient declined     Physically Abused: Patient declined     Sexually Abused: Patient declined   Housing Stability: Low Risk  (3/28/2024)    [facility], [facility]    Housing Stability     What is your living situation today?: I have a steady place to live       Current Outpatient Medications:     lisinopril (ZESTRIL) 2.5 MG tablet, Take 1 tablet (2.5 mg) by mouth daily, Disp: 30 tablet, Rfl: 0    acetaminophen (TYLENOL) 325 MG tablet, Take 325-650 mg by mouth every 6 hours as needed for mild pain (Patient not taking: Reported on 8/26/2024), Disp: , Rfl:     beclomethasone HFA (QVAR REDIHALER) 40 MCG/ACT inhaler, 2 puffs, Disp: , Rfl:      Allergies   Allergen Reactions    Other Environmental Allergy        Past medical, surgical, social and family history were reviewed and updated in EPIC.    EXAM:  /80 (BP Location: Left arm, Patient Position: Chair, Cuff Size: Adult Large)   Ht 1.524 m (5')   Wt 78.5 kg (173 lb)   LMP 08/13/2024 (Exact Date)   Breastfeeding No   BMI 33.79 kg/m     BMI: Body mass index is 33.79 kg/m .  Constitutional: healthy, alert and no distress  Breast: No nodularity, asymmetry or nipple discharge bilaterally.  Gastrointestinal: Abdomen soft, non-tender, non-distended. No masses, organomegaly.  :  Vulva:  No external lesions, normal female hair distribution, no inguinal adenopathy.    Urethra:  Midline, non-tender, well supported, no discharge  Vagina:  Moist, pink, no abnormal discharge, no lesions  Uterus:  Normal size , non-tender, freely mobile  Cvx normal, small speculum used, slightly tender for patient  Ovaries:  No masses appreciated, non-tender, mobile  Rectal Exam: deferred  Musculoskeletal: extremities normal  Skin: no suspicious lesions or rashes  Psychiatric: Affect appropriate, cooperative,mentation appears normal.     COUNSELING:   Reviewed preventive health counseling, as reflected in patient  instructions  Special attention given to:        Contraception   reports that she has never smoked. She has never used smokeless tobacco.    Body mass index is 33.79 kg/m .  Weight management plan: Patient was referred to their PCP to discuss a diet and exercise plan.  FRAX Risk Assessment    ASSESSMENT:  23 year old female with satisfactory annual exam    1. Encounter for breast and pelvic examination    2. Screening for cervical cancer  - Pap Screen Only - Recommended Age 21 - 24 Years    3. Screening examination for STI  - NEISSERIA GONORRHOEA PCR  - CHLAMYDIA TRACHOMATIS PCR     Tati Maria MD

## 2024-08-27 LAB
C TRACH DNA SPEC QL NAA+PROBE: NEGATIVE
N GONORRHOEA DNA SPEC QL NAA+PROBE: NEGATIVE

## 2024-08-30 LAB
BKR LAB AP GYN ADEQUACY: NORMAL
BKR LAB AP GYN INTERPRETATION: NORMAL
BKR LAB AP HPV REFLEX: NORMAL
BKR LAB AP LMP: NORMAL
BKR LAB AP PREVIOUS ABNORMAL: NORMAL
PATH REPORT.COMMENTS IMP SPEC: NORMAL
PATH REPORT.COMMENTS IMP SPEC: NORMAL
PATH REPORT.RELEVANT HX SPEC: NORMAL

## 2024-09-26 ENCOUNTER — OFFICE VISIT (OUTPATIENT)
Dept: FAMILY MEDICINE | Facility: CLINIC | Age: 24
End: 2024-09-26
Payer: COMMERCIAL

## 2024-09-26 VITALS
HEART RATE: 84 BPM | BODY MASS INDEX: 33.28 KG/M2 | HEIGHT: 60 IN | OXYGEN SATURATION: 99 % | RESPIRATION RATE: 24 BRPM | TEMPERATURE: 98.2 F | SYSTOLIC BLOOD PRESSURE: 125 MMHG | DIASTOLIC BLOOD PRESSURE: 85 MMHG | WEIGHT: 169.5 LBS

## 2024-09-26 DIAGNOSIS — Q61.3 POLYCYSTIC KIDNEY DISEASE: ICD-10-CM

## 2024-09-26 DIAGNOSIS — S06.9XAS TRAUMATIC BRAIN INJURY, WITH UNKNOWN LOSS OF CONSCIOUSNESS STATUS, SEQUELA (H): ICD-10-CM

## 2024-09-26 DIAGNOSIS — J32.0 CHRONIC MAXILLARY SINUSITIS: ICD-10-CM

## 2024-09-26 DIAGNOSIS — Z01.818 PRE-OP EXAM: Primary | ICD-10-CM

## 2024-09-26 PROBLEM — S06.9XAA TBI (TRAUMATIC BRAIN INJURY) (H): Status: ACTIVE | Noted: 2024-09-26

## 2024-09-26 LAB
ABO/RH(D): NORMAL
ERYTHROCYTE [DISTWIDTH] IN BLOOD BY AUTOMATED COUNT: 11.8 % (ref 10–15)
HCT VFR BLD AUTO: 38.6 % (ref 35–47)
HGB BLD-MCNC: 12.8 G/DL (ref 11.7–15.7)
MCH RBC QN AUTO: 28.8 PG (ref 26.5–33)
MCHC RBC AUTO-ENTMCNC: 33.2 G/DL (ref 31.5–36.5)
MCV RBC AUTO: 87 FL (ref 78–100)
PLATELET # BLD AUTO: 273 10E3/UL (ref 150–450)
RBC # BLD AUTO: 4.44 10E6/UL (ref 3.8–5.2)
SPECIMEN EXPIRATION DATE: NORMAL
WBC # BLD AUTO: 6.7 10E3/UL (ref 4–11)

## 2024-09-26 PROCEDURE — 86901 BLOOD TYPING SEROLOGIC RH(D): CPT | Performed by: FAMILY MEDICINE

## 2024-09-26 PROCEDURE — 90656 IIV3 VACC NO PRSV 0.5 ML IM: CPT | Performed by: FAMILY MEDICINE

## 2024-09-26 PROCEDURE — 99204 OFFICE O/P NEW MOD 45 MIN: CPT | Mod: 25 | Performed by: FAMILY MEDICINE

## 2024-09-26 PROCEDURE — 80048 BASIC METABOLIC PNL TOTAL CA: CPT | Performed by: FAMILY MEDICINE

## 2024-09-26 PROCEDURE — 85027 COMPLETE CBC AUTOMATED: CPT | Performed by: FAMILY MEDICINE

## 2024-09-26 PROCEDURE — 86900 BLOOD TYPING SEROLOGIC ABO: CPT | Performed by: FAMILY MEDICINE

## 2024-09-26 PROCEDURE — 90471 IMMUNIZATION ADMIN: CPT | Performed by: FAMILY MEDICINE

## 2024-09-26 PROCEDURE — 36415 COLL VENOUS BLD VENIPUNCTURE: CPT | Performed by: FAMILY MEDICINE

## 2024-09-26 RX ORDER — LISINOPRIL 2.5 MG/1
2.5 TABLET ORAL DAILY
Qty: 30 TABLET | Refills: 0 | Status: CANCELLED | OUTPATIENT
Start: 2024-09-26

## 2024-09-26 RX ORDER — CEFDINIR 300 MG/1
CAPSULE ORAL
COMMUNITY
End: 2024-09-26

## 2024-09-26 RX ORDER — PSEUDOEPHEDRINE HCL 120 MG/1
120 TABLET, FILM COATED, EXTENDED RELEASE ORAL EVERY 12 HOURS
COMMUNITY
End: 2024-09-26

## 2024-09-26 ASSESSMENT — PAIN SCALES - GENERAL: PAINLEVEL: NO PAIN (0)

## 2024-09-26 NOTE — PROGRESS NOTES
Preoperative Evaluation  [facility]  [address] [address]  Phone: [phone]  Primary Provider: Hortensia Valdez MD  Pre-op Performing Provider: Hortensia Valdez MD  Sep 26, 2024       Pre-operative Examination    Date of surgery: 10/14/2024  Proposed procedure: Sinus surgery  Anesthetic: General anesthesia  Surgeon: Dr. Jorge Ojeda  [facility]    Fax number for surgical facility: [phone]    Assessment & Plan     The proposed surgical procedure is considered INTERMEDIATE risk.    Pre-op exam  - CBC with platelets; Future  - Basic metabolic panel  (Ca, Cl, CO2, Creat, Gluc, K, Na, BUN); Future  - ABO and Rh; Future  - CBC with platelets  - Basic metabolic panel  (Ca, Cl, CO2, Creat, Gluc, K, Na, BUN)  - ABO and Rh    Chronic maxillary sinusitis    Polycystic kidney disease - stable, following with [facility]    Traumatic brain injury, with unknown loss of consciousness status, sequela (H24)      Risks and Recommendations  The patient has the following additional risks and recommendations for perioperative complications:   - No identified additional risk factors other than previously addressed    Antiplatelet or Anticoagulation Medication Instructions   - Patient is on no antiplatelet or anticoagulation medications.    Additional Medication Instructions  Take all scheduled medications on the day of surgery    Recommendation  Approval given to proceed with proposed procedure, without further diagnostic evaluation.    Milan Souza is a 24 year old, presenting for the following:  Establish Care (From [facility]) and Pre-Op Exam          9/26/2024     4:21 PM   Additional Questions   Accompanied by mom     HPI related to upcoming procedure: trouble with chronic sinus pressure         No data to display              Health Care Directive  Patient does not have a Health Care Directive or Living Will: Discussed  advance care planning with patient; information given to patient to review.    Preoperative Review of PMPAnswers submitted by the patient for this visit:  General Questionnaire (Submitted on 9/21/2024)  Chief Complaint: Chronic problems general questions HPI Form  What is the reason for your visit today? : First visit with adult doctor  How many days per week do you miss taking your medication?: 1  What makes it hard for you to take your medication every day?: remembering to take  1. No - Have you ever had a heart attack or stroke?  2. No - Have you ever had surgery on your heart or blood vessels, such as a stent, coronary (heart) bypass, or surgery on an artery in the head, neck, heart, or legs?  3. No - Do you have chest pain when you are physically active?  4. No - Do you have a history of heart failure?  5. Yes sinus - Do you currently have a cold, bronchitis, or symptoms of other respiratory (head and chest) infections?  6. Yes coughs - Do you have a cough, shortness of breath, or wheezing?  7. Unknown adopted - Do you or anyone in your family have a history of blood clots?  8. No - Do you or anyone in your family have a serious bleeding problem, such as long-lasting bleeding after surgeries or cuts?  9. No - Have you ever had anemia or been told to take iron pills?  10. No - Have you had any abnormal blood loss such as black, tarry or bloody stools, or abnormal vaginal bleeding?  11. No - Have you ever had a blood transfusion?  12. Yes - Are you willing to have a blood transfusion if it is medically needed before, during, or after your surgery?  13. No - Have you or anyone in your family ever had problems with anesthesia (sedation for surgery)?  14. No - Do you have sleep apnea, excessive snoring, or daytime drowsiness?   15. No - Do you have any artifical heart valves or other implanted medical devices, such as a pacemaker, defibrillator, or continuous glucose monitor?  16. No - Do you have any artifical  "joints?  17. No - Are you allergic to latex?  18. No - Is there any chance that you may be pregnant?         Status of Chronic Conditions:  See problem list for active medical problems.  Problems all longstanding and stable, except as noted/documented.  See ROS for pertinent symptoms related to these conditions.    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  No past surgical history on file.  Current Outpatient Medications   Medication Sig Dispense Refill    acetaminophen (TYLENOL) 325 MG tablet Take 325-650 mg by mouth every 6 hours as needed for mild pain.      Beclomethasone Dipropionate (QVAR IN) Inhale into the lungs.      lisinopril (ZESTRIL) 2.5 MG tablet Take 1 tablet (2.5 mg) by mouth daily 30 tablet 0    Loratadine (CLARITIN PO) Take by mouth.         Allergies   Allergen Reactions    Other Environmental Allergy         Social History     Tobacco Use    Smoking status: Never    Smokeless tobacco: Never   Substance Use Topics    Alcohol use: Yes     Comment: occ     No family history on file.  History   Drug Use Unknown             Review of Systems  Constitutional, neuro, ENT, endocrine, pulmonary, cardiac, gastrointestinal, genitourinary, musculoskeletal, integument and psychiatric systems are negative, except as otherwise noted.    Objective    /85 (BP Location: Right arm, Patient Position: Sitting, Cuff Size: Adult Regular)   Pulse 84   Temp 98.2  F (36.8  C) (Oral)   Resp 24   Ht 1.518 m (4' 11.75\")   Wt 76.9 kg (169 lb 8 oz)   LMP 09/10/2024 (Approximate)   SpO2 99%   BMI 33.38 kg/m     Estimated body mass index is 33.38 kg/m  as calculated from the following:    Height as of this encounter: 1.518 m (4' 11.75\").    Weight as of this " "encounter: 76.9 kg (169 lb 8 oz).  Physical Exam  GENERAL: alert and no distress  EYES: Eyes grossly normal to inspection, PERRL and conjunctivae and sclerae normal  HENT: ear canals and TM's normal, nose and mouth without ulcers or lesions  NECK: no adenopathy, no asymmetry, masses, or scars  RESP: lungs clear to auscultation - no rales, rhonchi or wheezes  CV: regular rate and rhythm, normal S1 S2, no S3 or S4, no murmur, click or rub, no peripheral edema  ABDOMEN: soft, nontender, no hepatosplenomegaly, no masses and bowel sounds normal  MS: no gross musculoskeletal defects noted, no edema  SKIN: no suspicious lesions or rashes  NEURO: Normal strength and tone, mentation intact and speech normal  PSYCH: mentation appears normal, affect normal/bright    No results for input(s): \"HGB\", \"PLT\", \"INR\", \"NA\", \"POTASSIUM\", \"CR\", \"A1C\" in the last 8760 hours.     Diagnostics  Labs pending at this time.  Results will be reviewed when available.   No EKG required, no history of coronary heart disease, significant arrhythmia, peripheral arterial disease or other structural heart disease.    Revised Cardiac Risk Index (RCRI)  The patient has the following serious cardiovascular risks for perioperative complications:   - No serious cardiac risks = 0 points     RCRI Interpretation: 0 points: Class I (very low risk - 0.4% complication rate)         Signed Electronically by: Hortensia Valdez MD  A copy of this evaluation report is provided to the requesting physician.         "

## 2024-09-26 NOTE — PROGRESS NOTES
"  {PROVIDER CHARTING PREFERENCE:350624}    Milan Souza is a 24 year old, presenting for the following health issues:  Establish Care (From [facility])      9/26/2024     4:21 PM   Additional Questions   Accompanied by mom     History of Present Illness       Reason for visit:  First visit with adult doctor    She eats 2-3 servings of fruits and vegetables daily.She consumes 1 sweetened beverage(s) daily.She exercises with enough effort to increase her heart rate 10 to 19 minutes per day.  She exercises with enough effort to increase her heart rate 3 or less days per week. She is missing 1 dose(s) of medications per week.  She is not taking prescribed medications regularly due to remembering to take.         {SUPERLIST (Optional):284650}  {additonal problems for provider to add (Optional):001355}    {ROS Picklists (Optional):869006}      Objective    /85 (BP Location: Right arm, Patient Position: Sitting, Cuff Size: Adult Regular)   Pulse 84   Temp 98.2  F (36.8  C) (Oral)   Resp 24   Ht 1.518 m (4' 11.75\")   Wt 76.9 kg (169 lb 8 oz)   LMP 09/10/2024 (Approximate)   SpO2 99%   BMI 33.38 kg/m    Body mass index is 33.38 kg/m .  Physical Exam   {Exam List (Optional):934886}    {Diagnostic Test Results (Optional):027974}        Signed Electronically by: Hortensia Valdez MD  {Email feedback regarding this note to [email or web address]   :922153}  "

## 2024-09-26 NOTE — PROGRESS NOTES
"  {PROVIDER CHARTING PREFERENCE:725105}    Milan Souza is a 24 year old, presenting for the following health issues:  Establish Care (From [facility]) and Pre-Op Exam        9/26/2024     4:21 PM   Additional Questions   Accompanied by mom     History of Present Illness       Reason for visit:  First visit with adult doctor    She eats 2-3 servings of fruits and vegetables daily.She consumes 1 sweetened beverage(s) daily.She exercises with enough effort to increase her heart rate 10 to 19 minutes per day.  She exercises with enough effort to increase her heart rate 3 or less days per week. She is missing 1 dose(s) of medications per week.  She is not taking prescribed medications regularly due to remembering to take.         {SUPERLIST (Optional):762813}  {additonal problems for provider to add (Optional):805970}    {ROS Picklists (Optional):770037}      Objective    /85 (BP Location: Right arm, Patient Position: Sitting, Cuff Size: Adult Regular)   Pulse 84   Temp 98.2  F (36.8  C) (Oral)   Resp 24   Ht 1.518 m (4' 11.75\")   Wt 76.9 kg (169 lb 8 oz)   LMP 09/10/2024 (Approximate)   SpO2 99%   BMI 33.38 kg/m    Body mass index is 33.38 kg/m .  Physical Exam   {Exam List (Optional):499204}    {Diagnostic Test Results (Optional):064248}        Signed Electronically by: Hortensia Valdez MD  {Email feedback regarding this note to [email or web address]   :537165}  "

## 2024-09-27 LAB
ANION GAP SERPL CALCULATED.3IONS-SCNC: 12 MMOL/L (ref 7–15)
BUN SERPL-MCNC: 15 MG/DL (ref 6–20)
CALCIUM SERPL-MCNC: 9.1 MG/DL (ref 8.8–10.4)
CHLORIDE SERPL-SCNC: 104 MMOL/L (ref 98–107)
CREAT SERPL-MCNC: 0.77 MG/DL (ref 0.51–0.95)
EGFRCR SERPLBLD CKD-EPI 2021: >90 ML/MIN/1.73M2
GLUCOSE SERPL-MCNC: 92 MG/DL (ref 70–99)
HCO3 SERPL-SCNC: 23 MMOL/L (ref 22–29)
POTASSIUM SERPL-SCNC: 4.2 MMOL/L (ref 3.4–5.3)
SODIUM SERPL-SCNC: 139 MMOL/L (ref 135–145)

## 2024-11-10 ENCOUNTER — MYC MEDICAL ADVICE (OUTPATIENT)
Dept: FAMILY MEDICINE | Facility: CLINIC | Age: 24
End: 2024-11-10
Payer: COMMERCIAL

## 2024-11-10 DIAGNOSIS — Q61.3 POLYCYSTIC KIDNEY DISEASE: ICD-10-CM

## 2024-11-11 ENCOUNTER — TRANSFERRED RECORDS (OUTPATIENT)
Dept: HEALTH INFORMATION MANAGEMENT | Facility: CLINIC | Age: 24
End: 2024-11-11
Payer: COMMERCIAL

## 2024-11-14 ENCOUNTER — TELEPHONE (OUTPATIENT)
Dept: FAMILY MEDICINE | Facility: CLINIC | Age: 24
End: 2024-11-14
Payer: COMMERCIAL

## 2024-11-14 DIAGNOSIS — Q61.3 POLYCYSTIC KIDNEY DISEASE: Primary | ICD-10-CM

## 2024-11-14 RX ORDER — LISINOPRIL 5 MG/1
5 TABLET ORAL DAILY
Qty: 90 TABLET | Refills: 3 | Status: SHIPPED | OUTPATIENT
Start: 2024-11-14 | End: 2024-11-14

## 2024-11-14 RX ORDER — LISINOPRIL 2.5 MG/1
2.5 TABLET ORAL DAILY
Qty: 90 TABLET | Refills: 3 | Status: SHIPPED | OUTPATIENT
Start: 2024-11-14

## 2024-11-14 NOTE — TELEPHONE ENCOUNTER
Mother calling stating pt is currently taking 5 mg of Lisinopril which use to be filled by Pediatrician. Pediatrician is wanting pt to have the Rx filled by current PCP. Mother states this was talked about at recent OV and provider stated they would be able to fill it.    Rx pended.    Martine CARUSO RN, BSN PHN

## 2024-11-14 NOTE — TELEPHONE ENCOUNTER
Mom calling back to report that she made a mistake and apologizes.    Reports Libby takes lisinopril 2.5mg once daily. Please resend correct rx.

## 2024-11-14 NOTE — TELEPHONE ENCOUNTER
Spoke with mother and advised of information below. Expressed understanding and acceptance of the plan. Had no further questions at this time.  Advised can call back to clinic at any time with concerns.     Martine CARUSO RN, BSN PHN

## 2024-12-11 ENCOUNTER — MYC MEDICAL ADVICE (OUTPATIENT)
Dept: FAMILY MEDICINE | Facility: CLINIC | Age: 24
End: 2024-12-11
Payer: COMMERCIAL

## 2024-12-11 DIAGNOSIS — Q61.3 POLYCYSTIC KIDNEY DISEASE: Primary | ICD-10-CM

## 2024-12-11 NOTE — TELEPHONE ENCOUNTER
Pt states she is on 5 mg of Lisinopril. Per Nephrology OV 4/1/24 - pt should be on 5 mg as BP is better controlled.    Please resend in 5 mg daily. Rx pended. Please update med list as appropriate.    Martine MARSHALLN, RN, PHN

## 2024-12-12 RX ORDER — LISINOPRIL 5 MG/1
5 TABLET ORAL DAILY
Qty: 90 TABLET | Refills: 3 | Status: SHIPPED | OUTPATIENT
Start: 2024-12-12

## 2024-12-17 ENCOUNTER — OFFICE VISIT (OUTPATIENT)
Dept: INTERNAL MEDICINE | Facility: CLINIC | Age: 24
End: 2024-12-17
Payer: COMMERCIAL

## 2024-12-17 VITALS
BODY MASS INDEX: 35.08 KG/M2 | WEIGHT: 174 LBS | TEMPERATURE: 98.1 F | HEART RATE: 113 BPM | OXYGEN SATURATION: 99 % | DIASTOLIC BLOOD PRESSURE: 76 MMHG | SYSTOLIC BLOOD PRESSURE: 130 MMHG | RESPIRATION RATE: 20 BRPM | HEIGHT: 59 IN

## 2024-12-17 DIAGNOSIS — J32.9 RHINOSINUSITIS: Primary | ICD-10-CM

## 2024-12-17 DIAGNOSIS — R05.1 ACUTE COUGH: ICD-10-CM

## 2024-12-17 PROCEDURE — 99213 OFFICE O/P EST LOW 20 MIN: CPT

## 2024-12-17 RX ORDER — AMOXICILLIN 875 MG/1
875 TABLET, COATED ORAL 2 TIMES DAILY
Qty: 14 TABLET | Refills: 0 | Status: SHIPPED | OUTPATIENT
Start: 2024-12-17

## 2024-12-17 RX ORDER — BENZONATATE 200 MG/1
200 CAPSULE ORAL 3 TIMES DAILY PRN
Qty: 52 CAPSULE | Refills: 0 | Status: SHIPPED | OUTPATIENT
Start: 2024-12-17

## 2024-12-17 RX ORDER — FLUTICASONE PROPIONATE 50 MCG
1 SPRAY, SUSPENSION (ML) NASAL DAILY
Qty: 11.1 ML | Refills: 0 | Status: SHIPPED | OUTPATIENT
Start: 2024-12-17

## 2024-12-17 ASSESSMENT — PAIN SCALES - GENERAL: PAINLEVEL_OUTOF10: NO PAIN (0)

## 2024-12-17 NOTE — PATIENT INSTRUCTIONS
Take Benzonatate three times a day as needed for cough. Take at bedtime    Amoxicillin twice a day until the last pill is taken    Fluticasone nasal spray to be taken daily as needed for nasal congestion

## 2024-12-17 NOTE — PROGRESS NOTES
Assessment & Plan   Problem List Items Addressed This Visit    None  Visit Diagnoses       Rhinosinusitis    -  Primary    Relevant Medications    amoxicillin (AMOXIL) 875 MG tablet    fluticasone (FLONASE) 50 MCG/ACT nasal spray    benzonatate (TESSALON) 200 MG capsule    Acute cough        Relevant Medications    fluticasone (FLONASE) 50 MCG/ACT nasal spray    benzonatate (TESSALON) 200 MG capsule                  MEDICATIONS:   Orders Placed This Encounter   Medications    amoxicillin (AMOXIL) 875 MG tablet     Sig: Take 1 tablet (875 mg) by mouth 2 times daily.     Dispense:  14 tablet     Refill:  0    fluticasone (FLONASE) 50 MCG/ACT nasal spray     Sig: Spray 1 spray into both nostrils daily.     Dispense:  11.1 mL     Refill:  0    benzonatate (TESSALON) 200 MG capsule     Sig: Take 1 capsule (200 mg) by mouth 3 times daily as needed for cough.     Dispense:  52 capsule     Refill:  0     Medications Discontinued During This Encounter   Medication Reason    Beclomethasone Dipropionate (QVAR IN)     Loratadine (CLARITIN PO)           - Continue other medications without change      Subjective   Libby is a 24 year old, presenting for the following health issues: Pt reports sore throat one week ago. Pt reports nasal congestion that is yellow and white. Pt reports unproductive cough. Pt reports post nasal drip with sinus pressure. Pt denies cough keeping her up at night. Pt reports chills but no night sweats. Pt denies any SOB. Pt endorses ear pressure.  Patient reports that she has temps around 99 almost 100 pounds but never approaching the 100.  Instructed patient to take benzonatate 3 times a day as needed for cough and take at bedtime    Instructed patient to take amoxicillin twice a day until the last pill was taken and to take to Nasal [place] in Each Nostril to Be Taken Daily As Needed for Nasal Congestion and Sinus Infection  URI        12/17/2024     2:08 PM   Additional Questions   Roomed by Karen  "CMA     History of Present Illness       Reason for visit:  Sore throat  Symptom onset:  3-7 days ago  Symptoms include:  Sore throat, occasional hot head  Symptom intensity:  Moderate  Symptom progression:  Staying the same  Had these symptoms before:  No  What makes it worse:  Waking up in A.M can t breathe nose all stuffed up She is missing 2 dose(s) of medications per week.  She is not taking prescribed medications regularly due to remembering to take.                 Review of Systems  Constitutional, HEENT, cardiovascular, pulmonary, gi and gu systems are negative, except as otherwise noted.      Objective    /76   Pulse 113   Temp 98.1  F (36.7  C)   Resp 20   Ht 1.499 m (4' 11\")   Wt 78.9 kg (174 lb)   SpO2 99%   Breastfeeding No   BMI 35.14 kg/m    Body mass index is 35.14 kg/m .  Physical Exam   GENERAL: alert and no distress  HENT: normal cephalic/atraumatic, right ear: normal: no effusions, no erythema, normal landmarks, left ear: normal: no effusions, no erythema, normal landmarks, nose and mouth without ulcers or lesions, rhinorrhea yellow and white, oral mucous membranes moist, sinuses: maxillary, frontal tenderness on both sides, and erythematous oropharynx  NECK: bilateral anterior cervical adenopathy, no asymmetry, masses, or scars, and thyroid normal to palpation  RESP: lungs clear to auscultation - no rales, rhonchi or wheezes  CV: regular rate and rhythm, normal S1 S2, no S3 or S4, no murmur, click or rub, no peripheral edema  ABDOMEN: soft, nontender, no hepatosplenomegaly, no masses and bowel sounds normal  MS: no gross musculoskeletal defects noted, no edema  SKIN: no suspicious lesions or rashes  NEURO: Normal strength and tone, mentation intact and speech normal  PSYCH: mentation appears normal, affect normal/bright            Signed Electronically by: EMPERATRIZ Thao CNP    "

## 2025-01-13 DIAGNOSIS — J32.9 RHINOSINUSITIS: ICD-10-CM

## 2025-01-13 RX ORDER — FLUTICASONE PROPIONATE 50 MCG
1 SPRAY, SUSPENSION (ML) NASAL DAILY
Qty: 17 G | Refills: 1 | OUTPATIENT
Start: 2025-01-13

## 2025-01-13 RX ORDER — FLUTICASONE PROPIONATE 50 MCG
1 SPRAY, SUSPENSION (ML) NASAL DAILY
Qty: 17 G | Refills: 1 | Status: SHIPPED | OUTPATIENT
Start: 2025-01-13

## 2025-02-13 ENCOUNTER — OFFICE VISIT (OUTPATIENT)
Dept: INTERNAL MEDICINE | Facility: CLINIC | Age: 25
End: 2025-02-13
Payer: COMMERCIAL

## 2025-02-13 ENCOUNTER — TRANSFERRED RECORDS (OUTPATIENT)
Dept: HEALTH INFORMATION MANAGEMENT | Facility: CLINIC | Age: 25
End: 2025-02-13

## 2025-02-13 VITALS
HEART RATE: 106 BPM | OXYGEN SATURATION: 100 % | BODY MASS INDEX: 36.52 KG/M2 | RESPIRATION RATE: 20 BRPM | DIASTOLIC BLOOD PRESSURE: 64 MMHG | TEMPERATURE: 97.8 F | WEIGHT: 180.8 LBS | SYSTOLIC BLOOD PRESSURE: 98 MMHG

## 2025-02-13 DIAGNOSIS — J02.0 STREPTOCOCCAL SORE THROAT: Primary | ICD-10-CM

## 2025-02-13 PROBLEM — S06.9XAA TRAUMATIC BRAIN INJURY (H): Status: ACTIVE | Noted: 2025-02-13

## 2025-02-13 PROBLEM — S09.90XS BALANCE DISTURBANCE DUE TO OLD HEAD INJURY: Status: ACTIVE | Noted: 2025-02-13

## 2025-02-13 PROBLEM — R26.89 BALANCE PROBLEMS: Status: ACTIVE | Noted: 2025-02-13

## 2025-02-13 PROBLEM — Z91.81 AT HIGH RISK FOR FALLS: Status: ACTIVE | Noted: 2025-02-13

## 2025-02-13 PROBLEM — I10 HYPERTENSION: Status: ACTIVE | Noted: 2021-12-23

## 2025-02-13 PROBLEM — R13.10 DYSPHAGIA: Status: ACTIVE | Noted: 2025-02-13

## 2025-02-13 PROBLEM — G81.11 SPASTIC HEMIPLEGIA AFFECTING RIGHT DOMINANT SIDE (H): Status: ACTIVE | Noted: 2025-02-13

## 2025-02-13 PROBLEM — Q61.02 MULTIPLE RENAL CYSTS: Status: ACTIVE | Noted: 2021-12-24

## 2025-02-13 PROBLEM — R25.2 SPASTICITY: Status: ACTIVE | Noted: 2025-02-13

## 2025-02-13 PROBLEM — R06.89 INEFFECTIVE AIRWAY CLEARANCE: Status: ACTIVE | Noted: 2025-02-13

## 2025-02-13 PROBLEM — S06.2XAA: Status: ACTIVE | Noted: 2025-02-13

## 2025-02-13 PROBLEM — R26.89 BALANCE DISTURBANCE DUE TO OLD HEAD INJURY: Status: ACTIVE | Noted: 2025-02-13

## 2025-02-13 LAB
DEPRECATED S PYO AG THROAT QL EIA: NEGATIVE
S PYO DNA THROAT QL NAA+PROBE: NOT DETECTED

## 2025-02-13 ASSESSMENT — PAIN SCALES - GENERAL: PAINLEVEL_OUTOF10: NO PAIN (0)

## 2025-02-13 ASSESSMENT — ENCOUNTER SYMPTOMS: SORE THROAT: 1

## 2025-02-13 NOTE — PROGRESS NOTES
"  Assessment & Plan     Streptococcal sore throat  Pt reports that for 2-3 days she has had a sore throat with a patch of white on both sides of her throat. Pt describes the pain as sharp and burning. Pt admits to dysphagia.  - Streptococcus A Rapid Screen w/Reflex to PCR - Clinic Collect  - Group A Streptococcus PCR Throat Swab          BMI  Estimated body mass index is 36.52 kg/m  as calculated from the following:    Height as of 12/17/24: 1.499 m (4' 11\").    Weight as of this encounter: 82 kg (180 lb 12.8 oz).             Milan Souza is a 24 year old, presenting for the following health issues: Pt reports that for 2-3 days she has had a sore throat with a patch of white on both sides of her throat. Pt describes the pain as sharp and burning. Pt admits to dysphagia. Pt endorses nasal congestion that started before the sore throat. No cough. Pt denies any fevers, night sweats, endorses chills. Pt endorses her ears feeling congested, no ear pain, no tinnitus. No HA pain. Pt has been sneezing. Pt denies nausea no diarrhea. Pt reports Tylenol may help and cold water.    Pharyngitis (Soreness in throat. White rash in the back of her throat.)        2/13/2025    11:06 AM   Additional Questions   Roomed by Lizett Veliz CMA   Accompanied by mom         2/13/2025    11:06 AM   Patient Reported Additional Medications   Patient reports taking the following new medications no     Pharyngitis     History of Present Illness       Reason for visit:  Sore throat since monday, torable monday, Tuesday is when I really noticed it  Symptom onset:  1-3 days ago  Symptoms include:  Sore and irritated throat  Symptom intensity:  Moderate  Symptom progression:  Staying the same  Had these symptoms before:  No  What makes it worse:  Sittings still  What makes it better:  Staying busy to keep mine off of the pain, Tylenol helps pain a little bit but not much She is missing 2 dose(s) of medications per week.  She is not taking " prescribed medications regularly due to remembering to take.                 Review of Systems  Constitutional, HEENT, cardiovascular, pulmonary, gi and gu systems are negative, except as otherwise noted.      Objective    BP (!) 143/101 (BP Location: Left arm, Patient Position: Sitting, Cuff Size: Adult Regular)   Pulse 106   Temp 97.8  F (36.6  C) (Oral)   Resp 20   Wt 82 kg (180 lb 12.8 oz)   LMP 02/01/2025 (Exact Date)   SpO2 100%   BMI 36.52 kg/m    Body mass index is 36.52 kg/m .  Physical Exam   GENERAL: alert and no distress  HENT: normal cephalic/atraumatic, right ear: normal: no effusions, no erythema, normal landmarks, left ear: normal: no effusions, no erythema, normal landmarks, nose and mouth without ulcers or lesions, nasal mucosa edematous , oropharynx clear, oral mucous membranes moist, tonsillar hypertrophy, tonsillar erythema, and Grade II/IV tonsillar hypertrophy with a white ulcerated patch noted on each tonsillar pillar  NECK: anterior cervical adenopathy bilaterally, no asymmetry, masses, or scars  RESP: lungs clear to auscultation - no rales, rhonchi or wheezes  CV: regular rate and rhythm, normal S1 S2, no S3 or S4, no murmur, click or rub, no peripheral edema  ABDOMEN: soft, nontender, no hepatosplenomegaly, no masses and bowel sounds normal  MS: no gross musculoskeletal defects noted, no edema  SKIN: no suspicious lesions or rashes  NEURO: Normal strength and tone, mentation intact and speech normal  PSYCH: mentation appears normal, affect normal/bright            Signed Electronically by: EMPERATRIZ Thao CNP

## 2025-02-27 ENCOUNTER — TRANSFERRED RECORDS (OUTPATIENT)
Dept: HEALTH INFORMATION MANAGEMENT | Facility: CLINIC | Age: 25
End: 2025-02-27
Payer: COMMERCIAL

## 2025-03-31 DIAGNOSIS — Q61.3 POLYCYSTIC KIDNEY DISEASE: Primary | ICD-10-CM

## 2025-04-17 ENCOUNTER — TRANSFERRED RECORDS (OUTPATIENT)
Dept: HEALTH INFORMATION MANAGEMENT | Facility: CLINIC | Age: 25
End: 2025-04-17
Payer: COMMERCIAL

## 2025-05-29 ENCOUNTER — TRANSFERRED RECORDS (OUTPATIENT)
Dept: HEALTH INFORMATION MANAGEMENT | Facility: CLINIC | Age: 25
End: 2025-05-29
Payer: COMMERCIAL

## 2025-06-06 ENCOUNTER — LAB (OUTPATIENT)
Dept: LAB | Facility: CLINIC | Age: 25
End: 2025-06-06
Payer: COMMERCIAL

## 2025-06-06 DIAGNOSIS — Q61.3 POLYCYSTIC KIDNEY DISEASE: ICD-10-CM

## 2025-06-06 LAB
ALBUMIN MFR UR ELPH: <6 MG/DL
ALBUMIN SERPL BCG-MCNC: 4.2 G/DL (ref 3.5–5.2)
ALBUMIN UR-MCNC: NEGATIVE MG/DL
ALP SERPL-CCNC: 50 U/L (ref 40–150)
ALT SERPL W P-5'-P-CCNC: 23 U/L (ref 0–50)
ANION GAP SERPL CALCULATED.3IONS-SCNC: 11 MMOL/L (ref 7–15)
APPEARANCE UR: CLEAR
AST SERPL W P-5'-P-CCNC: 21 U/L (ref 0–45)
BACTERIA #/AREA URNS HPF: ABNORMAL /HPF
BASOPHILS # BLD AUTO: 0 10E3/UL (ref 0–0.2)
BASOPHILS NFR BLD AUTO: 0 %
BILIRUB SERPL-MCNC: 0.2 MG/DL
BILIRUB UR QL STRIP: NEGATIVE
BUN SERPL-MCNC: 19.8 MG/DL (ref 6–20)
CALCIUM SERPL-MCNC: 9.5 MG/DL (ref 8.8–10.4)
CHLORIDE SERPL-SCNC: 107 MMOL/L (ref 98–107)
COLOR UR AUTO: YELLOW
CREAT SERPL-MCNC: 0.64 MG/DL (ref 0.51–0.95)
CREAT UR-MCNC: 59.4 MG/DL
EGFRCR SERPLBLD CKD-EPI 2021: >90 ML/MIN/1.73M2
EOSINOPHIL # BLD AUTO: 0.1 10E3/UL (ref 0–0.7)
EOSINOPHIL NFR BLD AUTO: 2 %
ERYTHROCYTE [DISTWIDTH] IN BLOOD BY AUTOMATED COUNT: 12.4 % (ref 10–15)
GLUCOSE SERPL-MCNC: 91 MG/DL (ref 70–99)
GLUCOSE UR STRIP-MCNC: NEGATIVE MG/DL
HCO3 SERPL-SCNC: 21 MMOL/L (ref 22–29)
HCT VFR BLD AUTO: 39.1 % (ref 35–47)
HGB BLD-MCNC: 13.3 G/DL (ref 11.7–15.7)
HGB UR QL STRIP: NEGATIVE
IMM GRANULOCYTES # BLD: 0 10E3/UL
IMM GRANULOCYTES NFR BLD: 0 %
KETONES UR STRIP-MCNC: NEGATIVE MG/DL
LEUKOCYTE ESTERASE UR QL STRIP: NEGATIVE
LYMPHOCYTES # BLD AUTO: 2.2 10E3/UL (ref 0.8–5.3)
LYMPHOCYTES NFR BLD AUTO: 26 %
MCH RBC QN AUTO: 28.9 PG (ref 26.5–33)
MCHC RBC AUTO-ENTMCNC: 34 G/DL (ref 31.5–36.5)
MCV RBC AUTO: 85 FL (ref 78–100)
MONOCYTES # BLD AUTO: 0.9 10E3/UL (ref 0–1.3)
MONOCYTES NFR BLD AUTO: 11 %
NEUTROPHILS # BLD AUTO: 5.1 10E3/UL (ref 1.6–8.3)
NEUTROPHILS NFR BLD AUTO: 61 %
NITRATE UR QL: NEGATIVE
PH UR STRIP: 6 [PH] (ref 5–7)
PLATELET # BLD AUTO: 313 10E3/UL (ref 150–450)
POTASSIUM SERPL-SCNC: 4.2 MMOL/L (ref 3.4–5.3)
PROT SERPL-MCNC: 7 G/DL (ref 6.4–8.3)
PROT/CREAT 24H UR: NORMAL MG/G{CREAT}
RBC # BLD AUTO: 4.6 10E6/UL (ref 3.8–5.2)
RBC #/AREA URNS AUTO: ABNORMAL /HPF
SODIUM SERPL-SCNC: 139 MMOL/L (ref 135–145)
SP GR UR STRIP: 1.01 (ref 1–1.03)
SQUAMOUS #/AREA URNS AUTO: ABNORMAL /LPF
UROBILINOGEN UR STRIP-ACNC: 0.2 E.U./DL
WBC # BLD AUTO: 8.4 10E3/UL (ref 4–11)
WBC #/AREA URNS AUTO: ABNORMAL /HPF

## 2025-06-06 PROCEDURE — 84156 ASSAY OF PROTEIN URINE: CPT

## 2025-06-06 PROCEDURE — 81001 URINALYSIS AUTO W/SCOPE: CPT

## 2025-06-06 PROCEDURE — 36415 COLL VENOUS BLD VENIPUNCTURE: CPT

## 2025-06-06 PROCEDURE — 80053 COMPREHEN METABOLIC PANEL: CPT

## 2025-06-06 PROCEDURE — 85025 COMPLETE CBC W/AUTO DIFF WBC: CPT

## 2025-07-28 ENCOUNTER — PATIENT OUTREACH (OUTPATIENT)
Dept: CARE COORDINATION | Facility: CLINIC | Age: 25
End: 2025-07-28
Payer: COMMERCIAL

## 2025-08-07 ENCOUNTER — TRANSFERRED RECORDS (OUTPATIENT)
Dept: HEALTH INFORMATION MANAGEMENT | Facility: CLINIC | Age: 25
End: 2025-08-07
Payer: COMMERCIAL

## 2025-08-26 ENCOUNTER — MYC MEDICAL ADVICE (OUTPATIENT)
Dept: FAMILY MEDICINE | Facility: CLINIC | Age: 25
End: 2025-08-26
Payer: COMMERCIAL